# Patient Record
Sex: MALE | Race: WHITE | Employment: UNEMPLOYED | URBAN - NONMETROPOLITAN AREA
[De-identification: names, ages, dates, MRNs, and addresses within clinical notes are randomized per-mention and may not be internally consistent; named-entity substitution may affect disease eponyms.]

---

## 2021-04-27 ENCOUNTER — OFFICE VISIT (OUTPATIENT)
Dept: PRIMARY CARE CLINIC | Age: 26
End: 2021-04-27
Payer: MEDICAID

## 2021-04-27 VITALS
TEMPERATURE: 97.8 F | WEIGHT: 210 LBS | OXYGEN SATURATION: 98 % | HEIGHT: 71 IN | HEART RATE: 66 BPM | SYSTOLIC BLOOD PRESSURE: 120 MMHG | BODY MASS INDEX: 29.4 KG/M2 | DIASTOLIC BLOOD PRESSURE: 72 MMHG

## 2021-04-27 DIAGNOSIS — S76.211A INGUINAL STRAIN, RIGHT, INITIAL ENCOUNTER: Primary | ICD-10-CM

## 2021-04-27 PROCEDURE — 99204 OFFICE O/P NEW MOD 45 MIN: CPT | Performed by: NURSE PRACTITIONER

## 2021-04-27 PROCEDURE — 4004F PT TOBACCO SCREEN RCVD TLK: CPT | Performed by: NURSE PRACTITIONER

## 2021-04-27 PROCEDURE — G8419 CALC BMI OUT NRM PARAM NOF/U: HCPCS | Performed by: NURSE PRACTITIONER

## 2021-04-27 PROCEDURE — G8427 DOCREV CUR MEDS BY ELIG CLIN: HCPCS | Performed by: NURSE PRACTITIONER

## 2021-04-27 ASSESSMENT — PATIENT HEALTH QUESTIONNAIRE - PHQ9: SUM OF ALL RESPONSES TO PHQ QUESTIONS 1-9: 0

## 2021-04-27 ASSESSMENT — ENCOUNTER SYMPTOMS
RHINORRHEA: 1
SHORTNESS OF BREATH: 0
ABDOMINAL DISTENTION: 0
DIARRHEA: 0
SORE THROAT: 0
EYES NEGATIVE: 1
COUGH: 0
CONSTIPATION: 0

## 2021-04-27 NOTE — PATIENT INSTRUCTIONS
You may be receiving a survey from [employer] regarding your visit today. You may get this in the mail, through your MyChart or in your email. Please complete the survey to enable us to provide the highest quality of care to you and your family. If you cannot score us as very good ( 5 Stars) on any question, please feel free to call the office to discuss how we could have made your experience exceptional.     Thank You! VITO Esparza-CNP  [address] [place], [place]    Phone: [phone]  Fax: [address] [facility] Office Hours:  Monday: [facility] office location [phone] ([phone]) Offering additional late hours the first Monday of the month until 7 pm.   Tuesday: 8-5 Wednesday: 8-5 Thursday:  Additional hours offered 2 Thursdays a month. Please call to inquire those dates. Fridays: 7:30-4:30        Patient Education        Inguinal Hernia: Care Instructions  Your Care Instructions     An inguinal hernia occurs when tissue bulges through a weak spot in your groin area. You may see or feel a tender bulge in the groin or scrotum. You may also have pain, pressure or burning, or a feeling that something has \"given way. \"  Hernias are caused by a weakness in the belly wall. The bulge or discomfort may occur after heavy lifting, straining, or coughing. Hernias do not heal on their own, and they tend to get worse over time. If your hernia does not bother you, you most likely can wait to have surgery. Your hernia may get worse, but it may not. In some cases, hernias that are small and painless may never need to be repaired. Follow-up care is a key part of your treatment and safety. Be sure to make and go to all appointments, and call your doctor if you are having problems. It's also a good idea to know your test results and keep a list of the medicines you take. How can you care for yourself at home? · Take pain medicines exactly as directed.   ? If the doctor gave you a minutes at a time. Try to do this every 1 to 2 hours for the next 3 days (when you are awake) or until the swelling goes down. Put a thin cloth between the ice and your skin. · After 2 or 3 days, if your swelling is gone, apply heat. Put a warm water bottle, a heating pad set on low, or a warm cloth on your groin area. Do not go to sleep with a heating pad on your skin. · If your doctor gave you crutches, make sure you use them as directed. · Wear snug shorts or underwear that support the injured area. When should you call for help? Call your doctor now or seek immediate medical care if:    · You have new or severe pain or swelling in the groin area.     · Your groin or upper thigh is cool or pale or changes color.     · You have tingling, weakness, or numbness in your groin or leg.     · You cannot move your leg.     · You cannot put weight on your leg. Watch closely for changes in your health, and be sure to contact your doctor if:    · You do not get better as expected. Where can you learn more? Go to https://[employer]. org and sign in to your [employer] account. Enter P625 in the [employer] box to learn more about \"Groin Strain: Care Instructions. \"     If you do not have an account, please click on the \"Sign Up Now\" link. Current as of: November 16, 2020               Content Version: 12.8  © 0132-5874 Healthwise, [facility]. Care instructions adapted under license by [facility] ([facility]). If you have questions about a medical condition or this instruction, always ask your healthcare professional. [place] [address] any warranty or liability for your use of this information.

## 2021-04-27 NOTE — PROGRESS NOTES
[facility] [address] [facility] [address] [address]  Dept: [phone]  Dept Fax: [phone]    Last encounter Visit date not found    New Patient and Inguinal Hernia (pt believes that he may have a hernia in the Rt groin area. has been dealing with this for the past year. has just been taking tylenol and ibuprofen.)       HPI:   Lenny Shah is a 23 y.o. male who presentstoday for his medical conditions/complaints as noted below. Lenny Shah is c/o of New Patient and Inguinal Hernia (pt believes that he may have a hernia in the Rt groin area. has been dealing with this for the past year. has just been taking tylenol and ibuprofen.)      HPI  23year old male here today with concern for hernia right inguinal area. Had it for about a year beginning 2020. Heavy lifting prior to onset bulge area present. When urinate color change in bulge area per pt. With BM causes some discomfort. Currently not working, missed work due to tender and sharp pains in that area. No surgical hx. Social   , girlfriend  Has new daughter first child  Education: 11th grade finished, hopes to finish GED  Trying to get another job. Living [place]. Smoking 1 ppd x 12 years  No chew, no vaping. Alcohol rare use; whiskey limits to 1-2 drinks  No 's license. Medical hx no hospitalization. Hx bipolar disorder denies hx [place], no mental health hospital hospitalization. No suicide attemps. Vaccines as child took all. Chicken pox illness as child  No adult vaccines. Tdap up to date due to stepping on nail 2019. No allergy meds. Reviewed prior notes None  Reviewed previous Labs, Imaging and Hospital Records    Past Medical History:   Diagnosis Date    Bipolar disorder [facility])       History reviewed. No pertinent surgical history.     Family History   Problem Relation Age of Onset    Diabetes Father     Seizures Sister     Seizures Brother     Diabetes Paternal Grandfather        Social History     Tobacco Use    Smoking status: Current Every Day Smoker     Packs/day: 1.00     Start date: 4/27/2010    Smokeless tobacco: Never Used   Substance Use Topics    Alcohol use: Not on file      No current outpatient medications on file. No current facility-administered medications for this visit. Allergies   Allergen Reactions    Codeine Anaphylaxis    Seasonal        Health Maintenance   Topic Date Due    Hepatitis C screen  Never done    Varicella vaccine (1 of 2 - 2-dose childhood series) Never done    Pneumococcal 0-64 years Vaccine (1 of 1 - PPSV23) Never done    HPV vaccine (1 - Male 2-dose series) Never done    HIV screen  Never done    COVID-19 Vaccine (1) Never done    DTaP/Tdap/Td vaccine (1 - Tdap) Never done    Flu vaccine (Season Ended) 09/01/2021    Hepatitis A vaccine  Aged Out    Hepatitis B vaccine  Aged Out    Hib vaccine  Aged Out    Meningococcal (ACWY) vaccine  Aged Out       Subjective:      Review of Systems   Constitutional: Negative for activity change, appetite change, chills and fever. HENT: Positive for rhinorrhea. Negative for congestion and sore throat. Eyes: Negative. Respiratory: Negative for cough and shortness of breath. Cardiovascular: Negative for chest pain and leg swelling. Gastrointestinal: Negative for abdominal distention, constipation and diarrhea. Endocrine: Negative for cold intolerance, heat intolerance, polydipsia, polyphagia and polyuria. Genitourinary: Negative for difficulty urinating. Musculoskeletal: Negative for arthralgias. Skin: Negative for rash. Neurological: Negative for dizziness and headaches. Psychiatric/Behavioral: Negative for sleep disturbance. The patient is not nervous/anxious. Objective:     Physical Exam  Vitals signs and nursing note reviewed. Exam conducted with a chaperone present.    Constitutional: General: He is not in acute distress. Appearance: Normal appearance. He is well-developed. HENT:      Head: Normocephalic and atraumatic. Left Ear: Tympanic membrane and external ear normal.      Nose: No congestion. Mouth/Throat:      Mouth: Mucous membranes are moist.      Pharynx: No oropharyngeal exudate. Eyes:      Pupils: Pupils are equal, round, and reactive to light. Neck:      Musculoskeletal: Normal range of motion and neck supple. Cardiovascular:      Rate and Rhythm: Normal rate and regular rhythm. Pulses: Normal pulses. Heart sounds: Normal heart sounds. No murmur. Pulmonary:      Effort: Pulmonary effort is normal. No respiratory distress. Breath sounds: Normal breath sounds. Abdominal:      Palpations: Abdomen is soft. There is no mass. Tenderness: There is no abdominal tenderness. Genitourinary:     Penis: Normal.       Testes: Normal.      Comments: Scrotum with testes present equal bilaterally, no hernia appreciated on exam. Risk inguinal strain? Musculoskeletal: Normal range of motion. Lymphadenopathy:      Cervical: No cervical adenopathy. Skin:     Findings: No rash. Neurological:      Mental Status: He is alert and oriented to person, place, and time. Psychiatric:         Behavior: Behavior normal.         Thought Content: Thought content normal.         Judgment: Judgment normal.       /72 (Site: Right Upper Arm, Position: Sitting, Cuff Size: Medium Adult)   Pulse 66   Temp 97.8 °F (36.6 °C) (Infrared)   Ht 5' 11\" (1.803 m)   Wt 210 lb (95.3 kg)   SpO2 98%   BMI 29.29 kg/m²     Data:     No results found for: NA, K, CL, CO2, BUN, CREATININE, GLUCOSE, PROT, LABALBU, BILITOT, ALKPHOS, AST, ALT  No results found for: WBC, RBC, HGB, HCT, MCV, MCH, MCHC, RDW, PLT, MPV  No results found for: TSH  No results found for: CHOL, HDL, PSA, LABA1C       Assessment & Plan       1.  Inguinal strain, right, initial encounter  Pt describes bulge not appreciated on exam states has been bothering him more than 6 months. No distress on exam.   No discolorization  Will refer for recheck/may need CT.   - External Referral To General Surgery                  Completed Refills   Requested Prescriptions      No prescriptions requested or ordered in this encounter     No follow-ups on file. No orders of the defined types were placed in this encounter. Orders Placed This Encounter   Procedures    External Referral To General Surgery     Referral Priority:   Routine     Referral Type:   Eval and Treat     Referral Reason:   Specialty Services Required     Referred to Provider:   Namita Osorio     Requested Specialty:   General Surgery     Number of Visits Requested:   1         Tomi received counseling on the following healthy behaviors: nutrition, exercise and medication adherence  Reviewed prior labs and health maintenance. Continue current medications, diet and exercise. Discussed use, benefit, and side effects of prescribed medications. Barriers to medication compliance addressed. Patient given educational materials - see patient instructions. All patient questions answered. Patient voiced understanding. On this date 4/27/2021 I have spent 35 minutes reviewing previous notes, test results and face to face with the patient discussing the diagnosis and importance of compliance with the treatment plan as well as documenting on the day of the visit.      Electronically signed by VITO Fleming CNP on 4/28/2021 at 12:55 AM

## 2021-09-02 ENCOUNTER — TELEPHONE (OUTPATIENT)
Dept: PRIMARY CARE CLINIC | Age: 26
End: 2021-09-02

## 2021-09-02 NOTE — TELEPHONE ENCOUNTER
----- Message from Jaylen Damien sent at 9/2/2021  9:59 AM EDT -----  Subject: Appointment Request    Reason for Call: Urgent (Patient Request) No Script    QUESTIONS  Type of Appointment? New Patient/New to Provider  Reason for appointment request? Requested Provider unavailable - Ino Kerr CNP  Additional Information for Provider? patient's mother is calling for the   patient to make an appointment to see Rogerio Bryant CNP to get prescription   for the patients bipolar and anxiety. They requested to be seen by Lyric Diaz CNP, please call mom and advise since there is no availability   showing.  ---------------------------------------------------------------------------  --------------  CALL BACK INFO  What is the best way for the office to contact you? OK to leave message on   voicemail  Preferred Call Back Phone Number? [phone]  ---------------------------------------------------------------------------  --------------  SCRIPT ANSWERS  Relationship to Patient? Parent  Representative Name? Jose Antonio Wallace - mother  Additional information verified (besides Name and Date of Birth)? Last 4   N  Specialty Confirmation? Primary Care  (Is the patient requesting to see the provider for a procedure?)? No  (Is the patient requesting to see the provider urgently  today or   tomorrow. )? Yes  Have you been diagnosed with, awaiting test results for, or told that you   are suspected of having COVID-19 (Coronavirus)? (If patient has tested   negative or was tested as a requirement for work, school, or travel and   not based on symptoms, answer no)? No  Do you currently have flu-like symptoms including fever or chills, cough,   shortness of breath, difficulty breathing, or new loss of taste or smell? No  Have you had close contact with someone with COVID-19 in the last 14 days? No  (Service Expert  click yes below to proceed with [employer] As Usual   Scheduling)?  Yes

## 2021-09-02 NOTE — TELEPHONE ENCOUNTER
Okay for appt if he is agreeable to follow with behavioral counseling/psychiatry for his long term mental health needs.        Олег PADRON CNP